# Patient Record
Sex: FEMALE | Race: WHITE | NOT HISPANIC OR LATINO | Employment: OTHER | URBAN - METROPOLITAN AREA
[De-identification: names, ages, dates, MRNs, and addresses within clinical notes are randomized per-mention and may not be internally consistent; named-entity substitution may affect disease eponyms.]

---

## 2017-09-11 ENCOUNTER — OFFICE VISIT (OUTPATIENT)
Dept: DERMATOLOGY | Facility: CLINIC | Age: 62
End: 2017-09-11
Payer: COMMERCIAL

## 2017-09-11 VITALS — SYSTOLIC BLOOD PRESSURE: 115 MMHG | HEART RATE: 62 BPM | DIASTOLIC BLOOD PRESSURE: 67 MMHG | OXYGEN SATURATION: 99 %

## 2017-09-11 DIAGNOSIS — B35.3 TINEA PEDIS OF LEFT FOOT: Primary | ICD-10-CM

## 2017-09-11 DIAGNOSIS — L82.1 SEBORRHEIC KERATOSIS: ICD-10-CM

## 2017-09-11 DIAGNOSIS — D18.00 ANGIOMA: ICD-10-CM

## 2017-09-11 DIAGNOSIS — D22.9 NEVUS: ICD-10-CM

## 2017-09-11 DIAGNOSIS — L81.4 LENTIGO: ICD-10-CM

## 2017-09-11 PROCEDURE — 99203 OFFICE O/P NEW LOW 30 MIN: CPT | Performed by: PHYSICIAN ASSISTANT

## 2017-09-11 RX ORDER — KETOCONAZOLE 20 MG/G
CREAM TOPICAL
Qty: 30 G | Refills: 1 | Status: SHIPPED | OUTPATIENT
Start: 2017-09-11 | End: 2018-10-18

## 2017-09-11 NOTE — MR AVS SNAPSHOT
"              After Visit Summary   2017    Hortencia Ramos    MRN: [id number]           Patient Information     Date Of Birth          1955        Visit Information        Provider Department      2017 1:30 PM Kathryn Schmidt PA-C [facility]        Today's Diagnoses     Tinea pedis of left foot    -  1    Nevus        Lentigo        Angioma        Seborrheic keratosis           Follow-ups after your visit        Who to contact     If you have questions or need follow up information about today's clinic visit or your schedule please contact [facility] directly at [phone].  Normal or non-critical lab and imaging results will be communicated to you by PernixDatahart, letter or phone within 4 business days after the clinic has received the results. If you do not hear from us within 7 days, please contact the clinic through PernixDatahart or phone. If you have a critical or abnormal lab result, we will notify you by phone as soon as possible.  Submit refill requests through Circular or call your pharmacy and they will forward the refill request to us. Please allow 3 business days for your refill to be completed.          Additional Information About Your Visit        MyChart Information     Circular lets you send messages to your doctor, view your test results, renew your prescriptions, schedule appointments and more. To sign up, go to [email or web address] . Click on \"Log in\" on the left side of the screen, which will take you to the Welcome page. Then click on \"Sign up Now\" on the right side of the page.     You will be asked to enter the access code listed below, as well as some personal information. Please follow the directions to create your username and password.     Your access code is: [email or web address]  Expires: 12/10/2017  2:42 PM     Your access code will  in 90 days. If you need help or a new code, please call your [facility] or [phone].        Care EveryWhere " ID     This is your Care EveryWhere ID. This could be used by other organizations to access your [place] medical records  [id number]        Your Vitals Were     Pulse Pulse Oximetry                62 99%           Blood Pressure from Last 3 Encounters:   09/11/17 115/67    Weight from Last 3 Encounters:   No data found for Wt              Today, you had the following     No orders found for display         Today's Medication Changes          These changes are accurate as of: 9/11/17  2:42 PM.  If you have any questions, ask your nurse or doctor.               Start taking these medicines.        Dose/Directions    ketoconazole 2 % cream   Commonly known as:  NIZORAL   Used for:  Tinea pedis of left foot   Started by:  Kathryn Schmidt PA-C        Apply to AA BID x 2-3 weeks   Quantity:  30 g   Refills:  1            Where to get your medicines      These medications were sent to [facility]- - [address] [address]     Phone:  [phone]     ketoconazole 2 % cream                Primary Care Provider    None Specified       No primary provider on file.        Equal Access to Services     IRON MÉNDEZ : Hadmiguelito parro Sobryant, waaxda luqadaha, qaybta kaalmada adeegyavern, ce mariscal . So [facility] [phone].    ATENCIÓN: Si habla español, tiene a najera disposición servicios gratuitos de asistencia lingüística. Llame al [phone].    We comply with applicable federal civil rights laws and [place] laws. We do not discriminate on the basis of race, color, national origin, age, disability sex, sexual orientation or gender identity.            Thank you!     Thank you for choosing [facility]  for your care. Our goal is always to provide you with excellent care. Hearing back from our patients is one way we can continue to improve our services. Please take a few minutes to complete the written  survey that you may receive in the mail after your visit with us. Thank you!             Your Updated Medication List - Protect others around you: Learn how to safely use, store and throw away your medicines at www.disposemymeds.org.          This list is accurate as of: 9/11/17  2:42 PM.  Always use your most recent med list.                   Brand Name Dispense Instructions for use Diagnosis    CITALOPRAM HYDROBROMIDE PO      Take 10 mg by mouth        IMITREX PO      Take by mouth every 8 hours as needed for migraine        ketoconazole 2 % cream    NIZORAL    30 g    Apply to AA BID x 2-3 weeks    Tinea pedis of left foot

## 2017-09-11 NOTE — NURSING NOTE
Chief Complaint   Patient presents with     Derm Problem     skin check       Initial /67  Pulse 62  SpO2 99% There is no height or weight on file to calculate BMI.  BP completed using cuff size: naty House LPN

## 2017-09-11 NOTE — PROGRESS NOTES
HPI:   Hortencia Ramos is a 61 year old female who presents for Full skin cancer screening.  chief complaint  Last Skin Exam: n/a      1st Baseline: yes  Personal HX of Skin Cancer: no   Personal HX of Malignant Melanoma: no   Family HX of Skin Cancer / Malignant Melanoma: Parents with possible NMSC  Personal HX of Atypical Moles:   no  Risk factors: frequent sun exposure; blistering burns in youth  New / Changing lesions:no  Social History: Just became a great grandmother  On review of systems, there are no further skin complaints, patient is feeling otherwise well.  See patient intake sheet.  ROS of the following were done and are negative: Constitutional, Eyes, Ears, Nose,   Mouth, Throat, Cardiovascular, Respiratory, GI, Genitourinary, Musculoskeletal,   Psychiatric, Endocrine, Allergic/Immunologic.    PHYSICAL EXAM:   Weight:  BP:   Skin exam performed as follows: Type 2 skin. Mood appropriate  Alert and Oriented X 3. Well developed, well nourished in no distress.  General appearance: Normal  Head including face: Normal  Eyes: conjunctiva and lids: Normal  Mouth: Lips, teeth, gums: Normal  Neck: Normal  Chest-breast/axillae: Normal  Back: Normal  Spleen and liver: Normal  Cardiovascular: Exam of peripheral vascular system by observation for swelling, varicosities, edema: Normal  Genitalia: groin, buttocks: Normal  Extremities: digits/nails (clubbing): Normal  Eccrine and Apocrine glands: Normal  Right upper extremity: Normal  Left upper extremity: Normal  Right lower extremity: Normal  Left lower extremity: Normal  Skin: Scalp and body hair: See below    Pt deferred exam of breasts, groin, buttocks: No    Other physical findings:  1. Multiple pigmented macules on extremities and trunk  2. Multiple pigmented macules on face, trunk and extremities  3. Multiple vascular papules on trunk, arms and legs  4. Multiple scattered keratotic plaques       Except as noted above, no other signs of skin cancer or melanoma.      ASSESSMENT/PLAN:   Benign Full skin cancer screening today. . Patient with history of none  Advised on monthly self exams and 1 year  Patient Education: Appropriate brochures given.    Multiple benign appearing nevi on arms, legs and trunk. Discussed ABCDEs of melanoma and sunscreen.   Multiple lentigos on arms, legs and trunk. Advised benign, no treatment needed.  Multiple scattered angiomas. Advised benign, no treatment needed.   Seborrheic keratosis on arms, legs and trunk. Advised benign, no treatment needed.  Tinea pedis vs eczema on left foot - start ketoconazole cream BID x 2-3 weeks. If no better will switch to a topical steroid.       Follow-up: yearly FSE/PRN sooner    1.) Patient was asked about new and changing moles. YES  2.) Patient received a complete physical skin examination: YES  3.) Patient was counseled to perform a monthly self skin examination: YES  Scribed By: Kathryn Schmidt, MS, PAISATU

## 2018-10-18 ENCOUNTER — OFFICE VISIT (OUTPATIENT)
Dept: DERMATOLOGY | Facility: CLINIC | Age: 63
End: 2018-10-18
Payer: COMMERCIAL

## 2018-10-18 VITALS — HEART RATE: 77 BPM | DIASTOLIC BLOOD PRESSURE: 67 MMHG | SYSTOLIC BLOOD PRESSURE: 118 MMHG | OXYGEN SATURATION: 98 %

## 2018-10-18 DIAGNOSIS — L81.4 LENTIGO: ICD-10-CM

## 2018-10-18 DIAGNOSIS — D22.9 NEVUS: Primary | ICD-10-CM

## 2018-10-18 DIAGNOSIS — D18.00 ANGIOMA: ICD-10-CM

## 2018-10-18 DIAGNOSIS — L82.1 SEBORRHEIC KERATOSIS: ICD-10-CM

## 2018-10-18 PROCEDURE — 99214 OFFICE O/P EST MOD 30 MIN: CPT | Performed by: PHYSICIAN ASSISTANT

## 2018-10-18 RX ORDER — CITALOPRAM HYDROBROMIDE 10 MG/1
10 TABLET ORAL
COMMUNITY
Start: 2018-08-02

## 2018-10-18 RX ORDER — MULTIVIT-MINERALS/FOLIC ACID 200 MCG
TABLET,CHEWABLE ORAL
COMMUNITY
Start: 2018-05-17

## 2018-10-18 RX ORDER — SUMATRIPTAN 25 MG/1
25 TABLET, FILM COATED ORAL
COMMUNITY
Start: 2018-05-17

## 2018-10-18 RX ORDER — MULTIVIT WITH MINERALS/LUTEIN
1000 TABLET ORAL
COMMUNITY
Start: 2018-05-17

## 2018-10-18 NOTE — NURSING NOTE
Initial /67  Pulse 77  SpO2 98% There is no height or weight on file to calculate BMI. .    Aletha Rizo LPN

## 2018-10-18 NOTE — MR AVS SNAPSHOT
"              After Visit Summary   10/18/2018    Hortencia Ramos    MRN: [id number]           Patient Information     Date Of Birth          1955        Visit Information        Provider Department      10/18/2018 11:30 AM Kathryn Schmidt PA-C [facility]        Today's Diagnoses     Nevus    -  1    Lentigo        Angioma        Seborrheic keratosis           Follow-ups after your visit        Who to contact     If you have questions or need follow up information about today's clinic visit or your schedule please contact [facility] directly at [phone].  Normal or non-critical lab and imaging results will be communicated to you by Call Loophart, letter or phone within 4 business days after the clinic has received the results. If you do not hear from us within 7 days, please contact the clinic through Call Loophart or phone. If you have a critical or abnormal lab result, we will notify you by phone as soon as possible.  Submit refill requests through ICONOGRAFICO or call your pharmacy and they will forward the refill request to us. Please allow 3 business days for your refill to be completed.          Additional Information About Your Visit        MyChart Information     ICONOGRAFICO lets you send messages to your doctor, view your test results, renew your prescriptions, schedule appointments and more. To sign up, go to [email or web address] . Click on \"Log in\" on the left side of the screen, which will take you to the Welcome page. Then click on \"Sign up Now\" on the right side of the page.     You will be asked to enter the access code listed below, as well as some personal information. Please follow the directions to create your username and password.     Your access code is: [email or web address]  Expires: 2019 11:44 AM     Your access code will  in 90 days. If you need help or a new code, please call your [facility] or [phone].        Care EveryWhere ID     This is your Care " EveryWhere ID. This could be used by other organizations to access your [place] medical records  [id number]        Your Vitals Were     Pulse Pulse Oximetry                77 98%           Blood Pressure from Last 3 Encounters:   10/18/18 118/67   09/11/17 115/67    Weight from Last 3 Encounters:   No data found for Wt              Today, you had the following     No orders found for display       Primary Care Provider Office Phone # Fax #    Larissa Jean Pierre Newman [phone]       [facility] [address]  [place] [address]        Equal Access to Services     [facility]: Hadii aad ku hadasho Soomaali, waaxda luqadaha, qaybta kaalmada adeegyada, ce mariscal . So [facility] [phone].    ATENCIÓN: Si habla español, tiene a najera disposición servicios gratuitos de asistencia lingüística. [facility] [phone].    We comply with applicable federal civil rights laws and [place] laws. We do not discriminate on the basis of race, color, national origin, age, disability, sex, sexual orientation, or gender identity.            Thank you!     Thank you for choosing [facility]  for your care. Our goal is always to provide you with excellent care. Hearing back from our patients is one way we can continue to improve our services. Please take a few minutes to complete the written survey that you may receive in the mail after your visit with us. Thank you!             Your Updated Medication List - Protect others around you: Learn how to safely use, store and throw away your medicines at www.disposemymeds.org.          This list is accurate as of 10/18/18 11:44 AM.  Always use your most recent med list.                   Brand Name Dispense Instructions for use Diagnosis    ascorbic acid 1000 MG Tabs    vitamin C     Take 1,000 mg by mouth        calcium carbonate 500 mg-vitamin D 200 units 500-200 MG-UNIT per tablet    OSCAL with D;OYSTER SHELL CALCIUM      Take 1 tablet by mouth        citalopram 10 MG tablet    celeXA     Take 10 mg by mouth        [employer] PROBIOTIC MAXIMUM STRENGTH Caps      Patient reports taking 2 times daily        FIBER CHOICE PO           GLUCOSAMINE HCL PO      Take 500 mg by mouth        SUMAtriptan 25 MG tablet    IMITREX     Take 25 mg by mouth

## 2018-10-18 NOTE — PROGRESS NOTES
HPI:   Hortencia Ramos is a 62 year old female who presents for Full skin cancer screening.  chief complaint  Last Skin Exam: n/a      1st Baseline: yes  Personal HX of Skin Cancer: no   Personal HX of Malignant Melanoma: no   Family HX of Skin Cancer / Malignant Melanoma: Parents with possible NMSC  Personal HX of Atypical Moles:   no  Risk factors: frequent sun exposure; blistering burns in youth  New / Changing lesions:no  Social History: has a 2 yo great grandson. [place] to [place] during the winter months.   On review of systems, there are no further skin complaints, patient is feeling otherwise well.  See patient intake sheet.  ROS of the following were done and are negative: Constitutional, Eyes, Ears, Nose,   Mouth, Throat, Cardiovascular, Respiratory, GI, Genitourinary, Musculoskeletal,   Psychiatric, Endocrine, Allergic/Immunologic.    PHYSICAL EXAM:   /67  Pulse 77  SpO2 98%  Skin exam performed as follows: Type 2 skin. Mood appropriate  Alert and Oriented X 3. Well developed, well nourished in no distress.  General appearance: Normal  Head including face: Normal  Eyes: conjunctiva and lids: Normal  Mouth: Lips, teeth, gums: Normal  Neck: Normal  Chest-breast/axillae: Normal  Back: Normal  Spleen and liver: Normal  Cardiovascular: Exam of peripheral vascular system by observation for swelling, varicosities, edema: Normal  Genitalia: groin, buttocks: Normal  Extremities: digits/nails (clubbing): Normal  Eccrine and Apocrine glands: Normal  Right upper extremity: Normal  Left upper extremity: Normal  Right lower extremity: Normal  Left lower extremity: Normal  Skin: Scalp and body hair: See below    Pt deferred exam of breasts, groin, buttocks: No    Other physical findings:  1. Multiple pigmented macules on extremities and trunk  2. Multiple pigmented macules on face, trunk and extremities  3. Multiple vascular papules on trunk, arms and legs  4. Multiple scattered keratotic plaques        Except as noted above, no other signs of skin cancer or melanoma.     ASSESSMENT/PLAN:   Benign Full skin cancer screening today. . Patient with history of none  Advised on monthly self exams and 1 year  Patient Education: Appropriate brochures given.    Multiple benign appearing nevi on arms, legs and trunk. Discussed ABCDEs of melanoma and sunscreen.   Multiple lentigos on arms, legs and trunk. Advised benign, no treatment needed.  Multiple scattered angiomas. Advised benign, no treatment needed.   Seborrheic keratosis on arms, legs and trunk. Advised benign, no treatment needed.         Follow-up: yearly FSE/PRN sooner    1.) Patient was asked about new and changing moles. YES  2.) Patient received a complete physical skin examination: YES  3.) Patient was counseled to perform a monthly self skin examination: YES  Scribed By: Kathryn Schmidt MS, PA-C

## 2018-10-18 NOTE — LETTER
10/18/2018         RE: Hortencia Ramos  [address] [facility] [address]        Dear Colleague,    Thank you for referring your patient, Hortencia Ramos, to the [facility]. Please see a copy of my visit note below.    HPI:   Hortencia Ramos is a 62 year old female who presents for Full skin cancer screening.  chief complaint  Last Skin Exam: n/a      1st Baseline: yes  Personal HX of Skin Cancer: no   Personal HX of Malignant Melanoma: no   Family HX of Skin Cancer / Malignant Melanoma: Parents with possible NMSC  Personal HX of Atypical Moles:   no  Risk factors: frequent sun exposure; blistering burns in youth  New / Changing lesions:no  Social History: has a 2 yo great grandson. [place] to [place] the [place].   On review of systems, there are no further skin complaints, patient is feeling otherwise well.  See patient intake sheet.  ROS of the following were done and are negative: Constitutional, Eyes, Ears, Nose,   Mouth, Throat, Cardiovascular, Respiratory, GI, Genitourinary, Musculoskeletal,   Psychiatric, Endocrine, Allergic/Immunologic.    PHYSICAL EXAM:   /67  Pulse 77  SpO2 98%  Skin exam performed as follows: Type 2 skin. Mood appropriate  Alert and Oriented X 3. Well developed, well nourished in no distress.  General appearance: Normal  Head including face: Normal  Eyes: conjunctiva and lids: Normal  Mouth: Lips, teeth, gums: Normal  Neck: Normal  Chest-breast/axillae: Normal  Back: Normal  Spleen and liver: Normal  Cardiovascular: Exam of peripheral vascular system by observation for swelling, varicosities, edema: Normal  Genitalia: groin, buttocks: Normal  Extremities: digits/nails (clubbing): Normal  Eccrine and Apocrine glands: Normal  Right upper extremity: Normal  Left upper extremity: Normal  Right lower extremity: Normal  Left lower extremity: Normal  Skin: Scalp and body hair: See below    Pt deferred exam of breasts, groin, buttocks:  No    Other physical findings:  1. Multiple pigmented macules on extremities and trunk  2. Multiple pigmented macules on face, trunk and extremities  3. Multiple vascular papules on trunk, arms and legs  4. Multiple scattered keratotic plaques       Except as noted above, no other signs of skin cancer or melanoma.     ASSESSMENT/PLAN:   Benign Full skin cancer screening today. . Patient with history of none  Advised on monthly self exams and 1 year  Patient Education: Appropriate brochures given.    Multiple benign appearing nevi on arms, legs and trunk. Discussed ABCDEs of melanoma and sunscreen.   Multiple lentigos on arms, legs and trunk. Advised benign, no treatment needed.  Multiple scattered angiomas. Advised benign, no treatment needed.   Seborrheic keratosis on arms, legs and trunk. Advised benign, no treatment needed.         Follow-up: yearly FSE/PRN sooner    1.) Patient was asked about new and changing moles. YES  2.) Patient received a complete physical skin examination: YES  3.) Patient was counseled to perform a monthly self skin examination: YES  Scribed By: Kathryn Schmidt, MS, PABaldevC      Again, thank you for allowing me to participate in the care of your patient.        Sincerely,        Kathryn Schmidt PA-C

## 2018-10-23 ENCOUNTER — HEALTH MAINTENANCE LETTER (OUTPATIENT)
Age: 63
End: 2018-10-23

## 2019-10-21 ENCOUNTER — OFFICE VISIT (OUTPATIENT)
Dept: DERMATOLOGY | Facility: CLINIC | Age: 64
End: 2019-10-21
Payer: COMMERCIAL

## 2019-10-21 VITALS
TEMPERATURE: 97.8 F | SYSTOLIC BLOOD PRESSURE: 105 MMHG | DIASTOLIC BLOOD PRESSURE: 71 MMHG | HEART RATE: 72 BPM | RESPIRATION RATE: 20 BRPM

## 2019-10-21 DIAGNOSIS — D18.01 ANGIOMA OF SKIN: ICD-10-CM

## 2019-10-21 DIAGNOSIS — L81.4 LENTIGO: ICD-10-CM

## 2019-10-21 DIAGNOSIS — L30.9 ACUTE DERMATITIS: Primary | ICD-10-CM

## 2019-10-21 DIAGNOSIS — L82.1 SEBORRHEIC KERATOSIS: ICD-10-CM

## 2019-10-21 DIAGNOSIS — D22.9 NEVUS: ICD-10-CM

## 2019-10-21 PROCEDURE — 99214 OFFICE O/P EST MOD 30 MIN: CPT | Performed by: PHYSICIAN ASSISTANT

## 2019-10-21 RX ORDER — TRIAMCINOLONE ACETONIDE 1 MG/G
CREAM TOPICAL
Qty: 60 G | Refills: 2 | Status: SHIPPED | OUTPATIENT
Start: 2019-10-21

## 2019-10-21 NOTE — PROGRESS NOTES
Initial /71 (BP Location: Right arm, Patient Position: Sitting, Cuff Size: Adult Large)   Pulse 72   Temp 97.8  F (36.6  C) (Tympanic)   Resp 20  There is no height or weight on file to calculate BMI. .    Luca COLEMAN RN   Specialty Clinics

## 2019-10-21 NOTE — LETTER
10/21/2019         RE: Hortencia Ramos  [address] [address] [address]        Dear Colleague,    Thank you for referring your patient, Hortencia Ramos, to the [facility]. Please see a copy of my visit note below.    Initial /71 (BP Location: Right arm, Patient Position: Sitting, Cuff Size: Adult Large)   Pulse 72   Temp 97.8  F (36.6  C) (Tympanic)   Resp 20  There is no height or weight on file to calculate BMI. .    Luca COLEMAN RN   Specialty Clinics     HPI:   Chief complaints: Hortencia Ramos is a 63 year old female who presents for Full skin cancer screening to rule out skin cancer   Last Skin Exam: 1 year ago      1st Baseline: no  Personal HX of Skin Cancer: no   Personal HX of Malignant Melanoma: no   Family HX of Skin Cancer / Malignant Melanoma: Yes parents with possible NMSC  Personal HX of Atypical Moles:   no  Risk factors: history of frequent sun exposure and burns; goes to [place] for the winter months  New / Changing lesions:no  Social History: Goes to [place] for the winter months. Her daughter and grandson live there after moving there to get farther from father who has issues with chemical dependency.   On review of systems, there are no further skin complaints, patient is feeling otherwise well.  See patient intake sheet.  ROS of the following were done and are negative: Constitutional, Eyes, Ears, Nose,   Mouth, Throat, Cardiovascular, Respiratory, GI, Genitourinary, Musculoskeletal,   Psychiatric, Endocrine, Allergic/Immunologic.    PHYSICAL EXAM:   /71 (BP Location: Right arm, Patient Position: Sitting, Cuff Size: Adult Large)   Pulse 72   Temp 97.8  F (36.6  C) (Tympanic)   Resp 20   Skin exam performed as follows: Type 2 skin. Mood appropriate  Alert and Oriented X 3. Well developed, well nourished in no distress.  General appearance: Normal  Head including face: Normal  Eyes: conjunctiva and lids: Normal  Mouth: Lips, teeth, gums: Normal  Neck:  Normal  Chest-breast/axillae: Normal  Back: Normal  Spleen and liver: Normal  Cardiovascular: Exam of peripheral vascular system by observation for swelling, varicosities, edema: Normal  Genitalia: groin, buttocks: Normal  Extremities: digits/nails (clubbing): Normal  Eccrine and Apocrine glands: Normal  Right upper extremity: Normal  Left upper extremity: Normal  Right lower extremity: Normal  Left lower extremity: Normal  Skin: Scalp and body hair: See below    Pt deferred exam of breasts, groin, buttocks: No    Other physical findings:  1. Multiple pigmented macules on extremities and trunk  2. Multiple pigmented macules on face, trunk and extremities  3. Multiple vascular papules on trunk, arms and legs  4. Multiple scattered keratotic plaques  5. Dermatitis on the left plantar surface       Except as noted above, no other signs of skin cancer or melanoma.     ASSESSMENT/PLAN:   Benign Full skin cancer screening today. . Patient with history of none  Advised on monthly self exams and 1 year  Patient Education: Appropriate brochures given.    1. Multiple benign appearing nevi on arms, legs and trunk. Discussed ABCDEs of melanoma and sunscreen.   2. Multiple lentigos on arms, legs and trunk. Advised benign, no treatment needed.  3. Multiple scattered angiomas. Advised benign, no treatment needed.   4. Seborrheic keratosis on arms, legs and trunk. Advised benign, no treatment needed.  5. 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 Hortencia to follow up with Primary Care provider regarding elevated blood pressure.            Follow-up: yearly FSE/PRN sooner    1.) Patient was asked about new and changing moles. YES  2.) Patient received a complete physical skin examination: YES  3.) Patient was counseled to perform a monthly self skin examination: YES  Scribed By: Kathryn Schmidt, MS, PA-C        Again, thank you for allowing me to participate in the care  of your patient.        Sincerely,        Kathryn Oconnor PA-C

## 2019-10-21 NOTE — PROGRESS NOTES
HPI:   Chief complaints: Hortencia Ramos is a 63 year old female who presents for Full skin cancer screening to rule out skin cancer   Last Skin Exam: 1 year ago      1st Baseline: no  Personal HX of Skin Cancer: no   Personal HX of Malignant Melanoma: no   Family HX of Skin Cancer / Malignant Melanoma: Yes parents with possible NMSC  Personal HX of Atypical Moles:   no  Risk factors: history of frequent sun exposure and burns; goes to [place] for the winter months  New / Changing lesions:no  Social History: Goes to [place] for the winter months. Her daughter and grandson live there after moving there to get farther from father who has issues with chemical dependency.   On review of systems, there are no further skin complaints, patient is feeling otherwise well.  See patient intake sheet.  ROS of the following were done and are negative: Constitutional, Eyes, Ears, Nose,   Mouth, Throat, Cardiovascular, Respiratory, GI, Genitourinary, Musculoskeletal,   Psychiatric, Endocrine, Allergic/Immunologic.    PHYSICAL EXAM:   /71 (BP Location: Right arm, Patient Position: Sitting, Cuff Size: Adult Large)   Pulse 72   Temp 97.8  F (36.6  C) (Tympanic)   Resp 20   Skin exam performed as follows: Type 2 skin. Mood appropriate  Alert and Oriented X 3. Well developed, well nourished in no distress.  General appearance: Normal  Head including face: Normal  Eyes: conjunctiva and lids: Normal  Mouth: Lips, teeth, gums: Normal  Neck: Normal  Chest-breast/axillae: Normal  Back: Normal  Spleen and liver: Normal  Cardiovascular: Exam of peripheral vascular system by observation for swelling, varicosities, edema: Normal  Genitalia: groin, buttocks: Normal  Extremities: digits/nails (clubbing): Normal  Eccrine and Apocrine glands: Normal  Right upper extremity: Normal  Left upper extremity: Normal  Right lower extremity: Normal  Left lower extremity: Normal  Skin: Scalp and body hair: See below    Pt deferred exam of breasts, groin,  buttocks: No    Other physical findings:  1. Multiple pigmented macules on extremities and trunk  2. Multiple pigmented macules on face, trunk and extremities  3. Multiple vascular papules on trunk, arms and legs  4. Multiple scattered keratotic plaques  5. Dermatitis on the left plantar surface       Except as noted above, no other signs of skin cancer or melanoma.     ASSESSMENT/PLAN:   Benign Full skin cancer screening today. . Patient with history of none  Advised on monthly self exams and 1 year  Patient Education: Appropriate brochures given.    1. Multiple benign appearing nevi on arms, legs and trunk. Discussed ABCDEs of melanoma and sunscreen.   2. Multiple lentigos on arms, legs and trunk. Advised benign, no treatment needed.  3. Multiple scattered angiomas. Advised benign, no treatment needed.   4. Seborrheic keratosis on arms, legs and trunk. Advised benign, no treatment needed.  5. 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 Hortencia to follow up with Primary Care provider regarding elevated blood pressure.            Follow-up: yearly FSE/PRN sooner    1.) Patient was asked about new and changing moles. YES  2.) Patient received a complete physical skin examination: YES  3.) Patient was counseled to perform a monthly self skin examination: YES  Scribed By: Kathryn Schmidt MS, PAISATU

## 2020-05-17 ENCOUNTER — VIRTUAL VISIT (OUTPATIENT)
Dept: URGENT CARE | Facility: CLINIC | Age: 65
End: 2020-05-17
Payer: COMMERCIAL

## 2020-05-17 DIAGNOSIS — J32.9 CHRONIC SINUSITIS, UNSPECIFIED LOCATION: Primary | ICD-10-CM

## 2020-05-17 PROCEDURE — 99203 OFFICE O/P NEW LOW 30 MIN: CPT | Mod: 95 | Performed by: FAMILY MEDICINE

## 2020-05-17 ASSESSMENT — ENCOUNTER SYMPTOMS
SHORTNESS OF BREATH: 0
EYE DISCHARGE: 0
CONSTIPATION: 0
VOMITING: 0
ABDOMINAL PAIN: 0
COUGH: 0
MYALGIAS: 0
DIARRHEA: 0
SINUS PAIN: 0
TROUBLE SWALLOWING: 0
FEVER: 0
DIFFICULTY URINATING: 0
RHINORRHEA: 1
FREQUENCY: 0
DYSPHORIC MOOD: 0
HEADACHES: 1
CHILLS: 0
APPETITE CHANGE: 0
EYE REDNESS: 0

## 2020-05-17 NOTE — PROGRESS NOTES
"The patient has been notified of following:     \"This telephone visit will be conducted via a call between you and your physician/provider. We have found that certain health care needs can be provided without the need for a physical exam.  This service lets us provide the care you need with a short phone conversation.  If a prescription is necessary we can send it directly to your pharmacy.      Telephone visits are billed at different rates depending on your insurance coverage. During this emergency period, for some insurers they may be billed the same as an in-person visit.  Please reach out to your insurance provider with any questions.    If during the course of the call the physician/provider feels a telephone visit is not appropriate, you will not be charged for this service.\"    Patient has given verbal consent for Telephone visit?  Yes      Subjective     CC: Hortencia Ramos is a 64 year old female with a history of chronic sinus problems and migraines since 2010 who presents to [facility] urgent care today for questions concerning these chronic problems. In 2010, she first developed these persistent sinus pain and discovered she was allergic to her dog. Since 2017, her symptoms seem to be related to seasonal allergies as they change with geographic location (worse in [place], better in [place]). She has also had about one acute sinus infection per year requiring antibiotics. However, even in [place] she is still has sinus congestion and migraines. Lately, her migraines have become more frequent (she has had two in the last week and a half). She normally takes rizatriptan when she gets a migraine but does not currently take anything for prophylaxis. Her last migraine was two days ago. She is not currently having a migraine or acute sinus symptoms and specifically denies fever/chills, headaches, visual disturbances and ear pain. She does have congestion, rhinorrhea and facial pressure when leaning forward, " but this is always present. Her primary doctor is Dr. Newman in [place], who she will be seeing in person later this week.    Chief Complaint   Patient presents with     Sinus Problem              There is no problem list on file for this patient.    Current Outpatient Medications   Medication     ascorbic acid (VITAMIN C) 1000 MG TABS     calcium carbonate 500 mg-vitamin D 200 units (OSCAL WITH D;OYSTER SHELL CALCIUM) 500-200 MG-UNIT per tablet     citalopram (CELEXA) 10 MG tablet     GLUCOSAMINE HCL PO     Inulin (FIBER CHOICE PO)     Probiotic Product ([employer] PROBIOTIC MAXIMUM STRENGTH) CAPS     SUMAtriptan (IMITREX) 25 MG tablet     triamcinolone (KENALOG) 0.1 % external cream     No current facility-administered medications for this visit.         Allergies   Allergen Reactions     Nitrofurantoin Nausea and Vomiting     Patient states that she had diarrhea as well.     Sulfa Drugs Rash     Sulfasalazine Rash       Reviewed and updated as needed this visit by Provider    Review of Systems   Constitutional: Negative for appetite change, chills and fever.   HENT: Positive for congestion and rhinorrhea. Negative for ear discharge, ear pain, hearing loss, sinus pain and trouble swallowing.    Eyes: Negative for discharge, redness and visual disturbance.   Respiratory: Negative for cough and shortness of breath.    Cardiovascular: Negative for chest pain.   Gastrointestinal: Negative for abdominal pain, constipation, diarrhea and vomiting.   Genitourinary: Negative for difficulty urinating, frequency and urgency.   Musculoskeletal: Negative for myalgias.   Skin: Negative for rash.   Neurological: Positive for headaches.   Psychiatric/Behavioral: Negative for dysphoric mood.          Objective    Gen: Patient is alert, oriented  Resp: Speaking full sentence, no audible shortness of breath.  No cough or wheeze.            Assessment/Plan:  Hortencia Ramos is a 65 yo F with a history of chronic sinus problems and  recurrent sinus infections since 2010. She is not acutely symptomatic, however she does want to begin the discussion about whether her migraines increasing in frequency could be related to her sinus problems. Plan to bring up these issues with Dr. Newman to get proper referral to ENT or allergist and to discuss the need for migraine prophylaxis given the increased frequency.    Chronic sinusitis, unspecified location  -Follow up with primary doctor on the need for imaging and/or allergy testing and/or referral to ENT    Migraines  -These migraines may be associated with chronic sinus problem. However, given their increased frequency approaching 4 times per month, discuss migraine prophylaxis with primary doctor.    Phone call duration:  30 minutes    Ozzie Aggarwal   05/17/209:50 AM

## 2020-05-19 NOTE — PROGRESS NOTES
I was present during this telephone visit with the medical student who participated in the service and in the documentation of the note. I have verified the history and personally performed the physical exam and medical decision making. I agree with the assessment and plan of care as documented in the note with the following additions:   10 years of sinus symptoms, unchanged. I recommended discussing ENT referral with her PCP for NPL vs sinus imaging. Could also consider allergy referral first but she has already had patch testing in the past it sounds like and knows her allergens.    I personally spent a total of 9 minutes speaking with Hortencia Ramos during today s visit.     Quinn Burrell MD  1:15 PM, May 19, 2020

## 2021-05-13 ENCOUNTER — OFFICE VISIT (OUTPATIENT)
Dept: DERMATOLOGY | Facility: CLINIC | Age: 66
End: 2021-05-13
Payer: COMMERCIAL

## 2021-05-13 VITALS — OXYGEN SATURATION: 98 % | DIASTOLIC BLOOD PRESSURE: 71 MMHG | HEART RATE: 71 BPM | SYSTOLIC BLOOD PRESSURE: 120 MMHG

## 2021-05-13 DIAGNOSIS — L81.4 LENTIGO: ICD-10-CM

## 2021-05-13 DIAGNOSIS — D18.01 ANGIOMA OF SKIN: ICD-10-CM

## 2021-05-13 DIAGNOSIS — L82.1 SEBORRHEIC KERATOSIS: ICD-10-CM

## 2021-05-13 DIAGNOSIS — D22.9 NEVUS: Primary | ICD-10-CM

## 2021-05-13 PROCEDURE — 99213 OFFICE O/P EST LOW 20 MIN: CPT | Performed by: PHYSICIAN ASSISTANT

## 2021-05-13 RX ORDER — RIZATRIPTAN BENZOATE 5 MG/1
TABLET, ORALLY DISINTEGRATING ORAL
COMMUNITY
Start: 2021-04-09

## 2021-05-13 NOTE — NURSING NOTE
Chief Complaint   Patient presents with     Skin Check       Vitals:    05/13/21 1141   BP: 120/71   Pulse: 71   SpO2: 98%     Wt Readings from Last 1 Encounters:   No data found for Wt       Greta Cohen LPN.................5/13/2021

## 2021-05-13 NOTE — PROGRESS NOTES
HPI:   Chief complaints: Hortencia Ramos is a 65 year old female who presents for Full skin cancer screening to rule out skin cancer   Last Skin Exam: 1 year ago      1st Baseline: no  Personal HX of Skin Cancer: no   Personal HX of Malignant Melanoma: no   Family HX of Skin Cancer / Malignant Melanoma: Yes parents with possible NMSC  Personal HX of Atypical Moles:   no  Risk factors: history of frequent sun exposure and burns; goes to [place] for the winter months  New / Changing lesions:no  Social History: Goes to [place] for the winter months. Her daughter and grandson live there after moving there to get farther from father who has issues with chemical dependency.   On review of systems, there are no further skin complaints, patient is feeling otherwise well.  See patient intake sheet.  ROS of the following were done and are negative: Constitutional, Eyes, Ears, Nose,   Mouth, Throat, Cardiovascular, Respiratory, GI, Genitourinary, Musculoskeletal,   Psychiatric, Endocrine, Allergic/Immunologic.    PHYSICAL EXAM:   /71   Pulse 71   SpO2 98%   Skin exam performed as follows: Type 2 skin. Mood appropriate  Alert and Oriented X 3. Well developed, well nourished in no distress.  General appearance: Normal  Head including face: Normal  Eyes: conjunctiva and lids: Normal  Mouth: Lips, teeth, gums: Normal  Neck: Normal  Chest-breast/axillae: Normal  Back: Normal  Spleen and liver: Normal  Cardiovascular: Exam of peripheral vascular system by observation for swelling, varicosities, edema: Normal  Genitalia: groin, buttocks: Normal  Extremities: digits/nails (clubbing): Normal  Eccrine and Apocrine glands: Normal  Right upper extremity: Normal  Left upper extremity: Normal  Right lower extremity: Normal  Left lower extremity: Normal  Skin: Scalp and body hair: See below    Pt deferred exam of breasts, groin, buttocks: No    Other physical findings:  1. Multiple pigmented macules on extremities and trunk  2. Multiple  pigmented macules on face, trunk and extremities  3. Multiple vascular papules on trunk, arms and legs  4. Multiple scattered keratotic plaques       Except as noted above, no other signs of skin cancer or melanoma.     ASSESSMENT/PLAN:   Benign Full skin cancer screening today. . Patient with history of none  Advised on monthly self exams and 1 year  Patient Education: Appropriate brochures given.    1. Multiple benign appearing nevi on arms, legs and trunk. Discussed ABCDEs of melanoma and sunscreen.   2. Multiple lentigos on arms, legs and trunk. Advised benign, no treatment needed.  3. Multiple scattered angiomas. Advised benign, no treatment needed.   4. Seborrheic keratosis on arms, legs and trunk. Advised benign, no treatment needed.            Follow-up: yearly FSE/PRN sooner    1.) Patient was asked about new and changing moles. YES  2.) Patient received a complete physical skin examination: YES  3.) Patient was counseled to perform a monthly self skin examination: YES  Scribed By: Kathryn Schmidt MS, PA-C

## 2021-05-13 NOTE — LETTER
5/13/2021         RE: Hortencia Ramos  [id number] [address] [place] [address]        Dear Colleague,    Thank you for referring your patient, Hortencia Ramos, to the [facility]. Please see a copy of my visit note below.    HPI:   Chief complaints: Hortencia Ramos is a 65 year old female who presents for Full skin cancer screening to rule out skin cancer   Last Skin Exam: 1 year ago      1st Baseline: no  Personal HX of Skin Cancer: no   Personal HX of Malignant Melanoma: no   Family HX of Skin Cancer / Malignant Melanoma: Yes parents with possible NMSC  Personal HX of Atypical Moles:   no  Risk factors: history of frequent sun exposure and burns; goes to [place] for the winter months  New / Changing lesions:no  Social History: Goes to [place] for the winter months. Her daughter and grandson live there after moving there to get farther from father who has issues with chemical dependency.   On review of systems, there are no further skin complaints, patient is feeling otherwise well.  See patient intake sheet.  ROS of the following were done and are negative: Constitutional, Eyes, Ears, Nose,   Mouth, Throat, Cardiovascular, Respiratory, GI, Genitourinary, Musculoskeletal,   Psychiatric, Endocrine, Allergic/Immunologic.    PHYSICAL EXAM:   /71   Pulse 71   SpO2 98%   Skin exam performed as follows: Type 2 skin. Mood appropriate  Alert and Oriented X 3. Well developed, well nourished in no distress.  General appearance: Normal  Head including face: Normal  Eyes: conjunctiva and lids: Normal  Mouth: Lips, teeth, gums: Normal  Neck: Normal  Chest-breast/axillae: Normal  Back: Normal  Spleen and liver: Normal  Cardiovascular: Exam of peripheral vascular system by observation for swelling, varicosities, edema: Normal  Genitalia: groin, buttocks: Normal  Extremities: digits/nails (clubbing): Normal  Eccrine and Apocrine glands: Normal  Right upper extremity: Normal  Left upper extremity:  Normal  Right lower extremity: Normal  Left lower extremity: Normal  Skin: Scalp and body hair: See below    Pt deferred exam of breasts, groin, buttocks: No    Other physical findings:  1. Multiple pigmented macules on extremities and trunk  2. Multiple pigmented macules on face, trunk and extremities  3. Multiple vascular papules on trunk, arms and legs  4. Multiple scattered keratotic plaques       Except as noted above, no other signs of skin cancer or melanoma.     ASSESSMENT/PLAN:   Benign Full skin cancer screening today. . Patient with history of none  Advised on monthly self exams and 1 year  Patient Education: Appropriate brochures given.    1. Multiple benign appearing nevi on arms, legs and trunk. Discussed ABCDEs of melanoma and sunscreen.   2. Multiple lentigos on arms, legs and trunk. Advised benign, no treatment needed.  3. Multiple scattered angiomas. Advised benign, no treatment needed.   4. Seborrheic keratosis on arms, legs and trunk. Advised benign, no treatment needed.            Follow-up: yearly FSE/PRN sooner    1.) Patient was asked about new and changing moles. YES  2.) Patient received a complete physical skin examination: YES  3.) Patient was counseled to perform a monthly self skin examination: YES  Scribed By: Kathryn Schmidt, MS, PABaldevC          Again, thank you for allowing me to participate in the care of your patient.        Sincerely,        Kathryn Schmidt PA-C

## 2021-05-25 ENCOUNTER — RECORDS - HEALTHEAST (OUTPATIENT)
Dept: ADMINISTRATIVE | Facility: CLINIC | Age: 66
End: 2021-05-25

## 2021-05-26 ENCOUNTER — RECORDS - HEALTHEAST (OUTPATIENT)
Dept: ADMINISTRATIVE | Facility: CLINIC | Age: 66
End: 2021-05-26

## 2021-07-13 ENCOUNTER — RECORDS - HEALTHEAST (OUTPATIENT)
Dept: ADMINISTRATIVE | Facility: CLINIC | Age: 66
End: 2021-07-13

## 2022-06-29 ENCOUNTER — OFFICE VISIT (OUTPATIENT)
Dept: ORTHOPEDICS | Facility: CLINIC | Age: 67
End: 2022-06-29
Payer: COMMERCIAL

## 2022-06-29 VITALS — HEART RATE: 62 BPM | HEIGHT: 69 IN | DIASTOLIC BLOOD PRESSURE: 75 MMHG | SYSTOLIC BLOOD PRESSURE: 121 MMHG

## 2022-06-29 DIAGNOSIS — M17.12 ARTHRITIS OF LEFT KNEE: ICD-10-CM

## 2022-06-29 DIAGNOSIS — M25.562 ACUTE PAIN OF LEFT KNEE: Primary | ICD-10-CM

## 2022-06-29 PROCEDURE — 99203 OFFICE O/P NEW LOW 30 MIN: CPT | Mod: 25 | Performed by: PEDIATRICS

## 2022-06-29 PROCEDURE — 20610 DRAIN/INJ JOINT/BURSA W/O US: CPT | Mod: LT | Performed by: PEDIATRICS

## 2022-06-29 RX ADMIN — TRIAMCINOLONE ACETONIDE 40 MG: 40 INJECTION, SUSPENSION INTRA-ARTICULAR; INTRAMUSCULAR at 14:08

## 2022-06-29 RX ADMIN — LIDOCAINE HYDROCHLORIDE 2 ML: 10 INJECTION, SOLUTION INFILTRATION; PERINEURAL at 14:08

## 2022-06-29 NOTE — PATIENT INSTRUCTIONS
We discussed these other possible diagnosis: more likely aggravation of underlying arthritis, possible meniscal tear.  We discussed the following treatment options: symptom treatment, activity modification/rest, imaging, injection, rehab and referral. Following discussion, plan: will trial injection and Home Exercise Program    Plan:  - Today's Plan of Care:  Steroid injection of the left knee was performed today in clinic  Icing for the next 1-2 days may be helpful for pain. Injection may take 10-14 days to see the full effect.  Continue with relative rest and activity modification, Ice, Compression, and Elevation.  Can apply ice 10-15 minutes 3-4 times per day as needed. OTC medications as needed.  Home Exercise Program - range of motion, quad sets, straight leg raises    -We also discussed other future treatment options:  Referral to Physical Therapy  MRI if severe pain, worsening symptoms    Follow Up: 6 - 8 weeks    If you have any further questions for your physician or physician s care team you can call [phone] and use option 3 to leave a voice message. Calls received during business hours will be returned same day.    After the Injection     After the injection, strenuous and repetitive activity should be minimized for approximately 48 hours.   Ice should be applied to the injected area at least for the next 48 hours.   Apply ice to the injected area at least 3 - 4 times a day for 20 minutes each time for the next 48 hours. This can reduce the painful  flare  reaction that can follow an injection the next day. This reaction can cause the area that was injected to hurt more the next day just from the injection. This will resolve within a day if it does occur.     Use over-the-counter pain medications such as Tylenol to help with the pain if necessary.     After 48 hours, icing the area may be continued if you find it beneficial.     The lidocaine or marcaine (commonly called Novocain) is an anesthetic  agent that is injected with the steroid will typically relieve your pain for a few hours following the injection. If the  Novocain  and steroid are injected near a nerve, you may experience local numbness or weakness from the nerve block until it wears off. After this wears off your pain may return until the steroid takes effect.   The steroid may be effective immediately after the injection. Do not be concerned if the injection is not effective in relieving your symptoms immediately. In some cases, it may take up to two weeks for the steroid to work.   If you are diabetic, the corticosteroid may cause your blood sugar to become elevated for several days following the injection. This response usually lasts about 2-4 days before it returns to your normal level.   You should report any adverse reaction to you doctor. Call if there are any questions.

## 2022-06-29 NOTE — LETTER
6/29/2022         RE: Hortencia Ramos  [address] [facility] [address]        Dear Colleague,    Thank you for referring your patient, Hortencia Ramos, to the [facility] [place]. Please see a copy of my visit note below.    ASSESSMENT & PLAN    Hortencia was seen today for pain.    Diagnoses and all orders for this visit:    Acute pain of left knee  -     XR Knee Standing AP Bilat [place] Bilat Lat Left; Future    Arthritis of left knee    Other orders  -     Large Joint Injection/Arthocentesis: L knee joint      This issue is acute and Unchanged.    We discussed these other possible diagnosis: more likely aggravation of underlying arthritis, possible meniscal tear.  We discussed the following treatment options: symptom treatment, activity modification/rest, imaging, injection, rehab and referral. Following discussion, plan: will trial injection and Home Exercise Program    Plan:  - Today's Plan of Care:  Steroid injection of the left knee was performed today in clinic  Icing for the next 1-2 days may be helpful for pain. Injection may take 10-14 days to see the full effect.  Continue with relative rest and activity modification, Ice, Compression, and Elevation.  Can apply ice 10-15 minutes 3-4 times per day as needed. OTC medications as needed.  Home Exercise Program - range of motion, quad sets, straight leg raises    -We also discussed other future treatment options:  Referral to Physical Therapy  MRI if severe pain, worsening symptoms    Follow Up: 6 - 8 weeks    Concerning signs and symptoms were reviewed.  The patient expressed understanding of this management plan and all questions were answered at this time.    Anahi Campbell MD [facility]  Sports Medicine Physician  [facility]      -----  Chief Complaint   Patient presents with     Left Knee - Pain       SUBJECTIVE  Hortencia Ramos is a/an 66 year old female who is seen as a self referral for evaluation of  "left knee pain.     The patient is seen by themselves.    Onset: 6 week(s) ago. Reports insidious onset without acute precipitating event.  Location of Pain: left knee pain; medial/lateral joint line, posterior   Worsened by: nothing  Better with: icing, resting   Treatments tried: rest/activity avoidance, Tylenol, ibuprofen and casting/splinting/bracing  Associated symptoms: swelling, weakness of left knee, locking or catching and feeling of instability, \"grabbing\" popping    Orthopedic/Surgical history: NO  Social History/Occupation: retired; garden, walking, hiking, kayaking    No family history pertinent to patient's problem today.    REVIEW OF SYSTEMS:  Review of Systems  Skin: no bruising, mild swelling  Musculoskeletal: as above  Neurologic: no numbness, paresthesias  Remainder of review of systems is negative including constitutional, CV, pulmonary, GI, except as noted in HPI or medical history.    OBJECTIVE:  /75   Pulse 62   Ht 1.753 m (5' 9\")    General: healthy, alert and in no distress  HEENT: no scleral icterus or conjunctival erythema  Skin: no suspicious lesions or rash. No jaundice.  CV: distal perfusion intact  Resp: normal respiratory effort without conversational dyspnea   Psych: normal mood and affect  Gait: normal steady gait with appropriate coordination and balance  Neuro: Normal light sensory exam of lower extremity    Bilateral Knee exam    Inspection:      mild effusion left    Patella:      Crepitus noted in the patellofemoral joint bilateral    Tender:      medial joint line left    Non Tender:      remainder of knee area bilateral    Knee ROM:      Full active and passive ROM with flexion and extension bilateral    Hip ROM:     Full active and passive ROM bilateral    Strength:      5/5 with knee extension left    Special Tests:     neg (-) Vanessa bilateral       neg (-) anterior drawer bilateral       neg (-) posterior drawer bilateral       neg (-) varus at 0 deg and 30 deg " left       neg (-) valgus at 0 deg and 30 deg left    Gait:      normal    Lower Extremity Alignment:      Normal    Neurovascular:      2+ peripheral pulses bilaterally and brisk capillary refill       sensation grossly intact    RADIOLOGY:  I independently ordered, visualized and reviewed these images with the patient  AP and sunrise bilateral and left lateral XR views of knees reviewed: no acute bony abnormality, mild medial compartment degenerative change  - will follow official read    Review of the result(s) of each unique test - xr       Large Joint Injection/Arthocentesis: L knee joint    Date/Time: 6/29/2022 2:08 PM  Performed by: Anahi Campbell MD  Authorized by: Anahi Campbell MD     Indications:  Pain  Needle Size:  25 G  Guidance: landmark guided    Approach:  Anterolateral  Location:  Knee      Medications:  2 mL lidocaine 1 %; 40 mg triamcinolone 40 MG/ML  Procedure discussed: discussed risks, benefits, and alternatives    Consent Given by:  Patient  Timeout: timeout called immediately prior to procedure    Prep: patient was prepped and draped in usual sterile fashion     The risks, benefits and complications of steroid injection were discussed with the patient (including but not limited to: bleeding, infection, pain, scar, damage to adjacent structures, atrophy or necrosis of soft tissue, skin blanching, failure to relieve symptoms, worsening of symptoms, allergic reaction). After this discussion all questions were addressed and answered and the patient elected to proceed. The patient tolerated the procedure well without complications.  Also discussed that if diabetic, recommend close monitoring of blood sugars over the next week as cortisone injections can temporarily elevate blood sugars.            Again, thank you for allowing me to participate in the care of your patient.        Sincerely,        Anahi Campbell MD

## 2022-06-29 NOTE — PROGRESS NOTES
ASSESSMENT & PLAN    Hortencia was seen today for pain.    Diagnoses and all orders for this visit:    Acute pain of left knee  -     XR Knee Standing AP Bilat [place] Bilat Lat Left; Future    Arthritis of left knee    Other orders  -     Large Joint Injection/Arthocentesis: L knee joint      This issue is acute and Unchanged.    We discussed these other possible diagnosis: more likely aggravation of underlying arthritis, possible meniscal tear.  We discussed the following treatment options: symptom treatment, activity modification/rest, imaging, injection, rehab and referral. Following discussion, plan: will trial injection and Home Exercise Program    Plan:  - Today's Plan of Care:  Steroid injection of the left knee was performed today in clinic  Icing for the next 1-2 days may be helpful for pain. Injection may take 10-14 days to see the full effect.  Continue with relative rest and activity modification, Ice, Compression, and Elevation.  Can apply ice 10-15 minutes 3-4 times per day as needed. OTC medications as needed.  Home Exercise Program - range of motion, quad sets, straight leg raises    -We also discussed other future treatment options:  Referral to Physical Therapy  MRI if severe pain, worsening symptoms    Follow Up: 6 - 8 weeks    Concerning signs and symptoms were reviewed.  The patient expressed understanding of this management plan and all questions were answered at this time.    Anahi Campbell MD [facility]  Sports Medicine Physician  [facility]      -----  Chief Complaint   Patient presents with     Left Knee - Pain       SUBJECTIVE  Hortencia Ramos is a/an 66 year old female who is seen as a self referral for evaluation of left knee pain.     The patient is seen by themselves.    Onset: 6 week(s) ago. Reports insidious onset without acute precipitating event.  Location of Pain: left knee pain; medial/lateral joint line, posterior   Worsened by: nothing  Better with: icing, resting  "  Treatments tried: rest/activity avoidance, Tylenol, ibuprofen and casting/splinting/bracing  Associated symptoms: swelling, weakness of left knee, locking or catching and feeling of instability, \"grabbing\" popping    Orthopedic/Surgical history: NO  Social History/Occupation: retired; garden, walking, hiking, kayaking    No family history pertinent to patient's problem today.    REVIEW OF SYSTEMS:  Review of Systems  Skin: no bruising, mild swelling  Musculoskeletal: as above  Neurologic: no numbness, paresthesias  Remainder of review of systems is negative including constitutional, CV, pulmonary, GI, except as noted in HPI or medical history.    OBJECTIVE:  /75   Pulse 62   Ht 1.753 m (5' 9\")    General: healthy, alert and in no distress  HEENT: no scleral icterus or conjunctival erythema  Skin: no suspicious lesions or rash. No jaundice.  CV: distal perfusion intact  Resp: normal respiratory effort without conversational dyspnea   Psych: normal mood and affect  Gait: normal steady gait with appropriate coordination and balance  Neuro: Normal light sensory exam of lower extremity    Bilateral Knee exam    Inspection:      mild effusion left    Patella:      Crepitus noted in the patellofemoral joint bilateral    Tender:      medial joint line left    Non Tender:      remainder of knee area bilateral    Knee ROM:      Full active and passive ROM with flexion and extension bilateral    Hip ROM:     Full active and passive ROM bilateral    Strength:      5/5 with knee extension left    Special Tests:     neg (-) Vanessa bilateral       neg (-) anterior drawer bilateral       neg (-) posterior drawer bilateral       neg (-) varus at 0 deg and 30 deg left       neg (-) valgus at 0 deg and 30 deg left    Gait:      normal    Lower Extremity Alignment:      Normal    Neurovascular:      2+ peripheral pulses bilaterally and brisk capillary refill       sensation grossly intact    RADIOLOGY:  I independently " ordered, visualized and reviewed these images with the patient  AP and sunrise bilateral and left lateral XR views of knees reviewed: no acute bony abnormality, mild medial compartment degenerative change  - will follow official read    Review of the result(s) of each unique test - xr       Large Joint Injection/Arthocentesis: L knee joint    Date/Time: 6/29/2022 2:08 PM  Performed by: Anahi Campbell MD  Authorized by: Anahi Campbell MD     Indications:  Pain  Needle Size:  25 G  Guidance: landmark guided    Approach:  Anterolateral  Location:  Knee      Medications:  2 mL lidocaine 1 %; 40 mg triamcinolone 40 MG/ML  Procedure discussed: discussed risks, benefits, and alternatives    Consent Given by:  Patient  Timeout: timeout called immediately prior to procedure    Prep: patient was prepped and draped in usual sterile fashion     The risks, benefits and complications of steroid injection were discussed with the patient (including but not limited to: bleeding, infection, pain, scar, damage to adjacent structures, atrophy or necrosis of soft tissue, skin blanching, failure to relieve symptoms, worsening of symptoms, allergic reaction). After this discussion all questions were addressed and answered and the patient elected to proceed. The patient tolerated the procedure well without complications.  Also discussed that if diabetic, recommend close monitoring of blood sugars over the next week as cortisone injections can temporarily elevate blood sugars.

## 2022-07-04 RX ORDER — TRIAMCINOLONE ACETONIDE 40 MG/ML
40 INJECTION, SUSPENSION INTRA-ARTICULAR; INTRAMUSCULAR
Status: SHIPPED | OUTPATIENT
Start: 2022-06-29

## 2022-07-04 RX ORDER — LIDOCAINE HYDROCHLORIDE 10 MG/ML
2 INJECTION, SOLUTION INFILTRATION; PERINEURAL
Status: SHIPPED | OUTPATIENT
Start: 2022-06-29

## 2022-07-16 ENCOUNTER — HEALTH MAINTENANCE LETTER (OUTPATIENT)
Age: 67
End: 2022-07-16

## 2022-08-22 ENCOUNTER — OFFICE VISIT (OUTPATIENT)
Dept: DERMATOLOGY | Facility: CLINIC | Age: 67
End: 2022-08-22
Payer: COMMERCIAL

## 2022-08-22 DIAGNOSIS — D18.01 ANGIOMA OF SKIN: ICD-10-CM

## 2022-08-22 DIAGNOSIS — L81.4 LENTIGO: ICD-10-CM

## 2022-08-22 DIAGNOSIS — L82.1 SEBORRHEIC KERATOSIS: ICD-10-CM

## 2022-08-22 DIAGNOSIS — D22.9 NEVUS: Primary | ICD-10-CM

## 2022-08-22 PROCEDURE — 99213 OFFICE O/P EST LOW 20 MIN: CPT | Performed by: PHYSICIAN ASSISTANT

## 2022-08-22 ASSESSMENT — PAIN SCALES - GENERAL: PAINLEVEL: NO PAIN (0)

## 2022-08-22 NOTE — PROGRESS NOTES
HPI:   Chief complaints: Hortencia Ramos is a 66 year old female who presents for Full skin cancer screening to rule out skin cancer   Last Skin Exam: 1 year ago      1st Baseline: no  Personal HX of Skin Cancer: no   Personal HX of Malignant Melanoma: no   Family HX of Skin Cancer / Malignant Melanoma: Yes parents with possible NMSC  Personal HX of Atypical Moles:   no  Risk factors: history of frequent sun exposure and burns; goes to [place] for the winter months  New / Changing lesions: yes spot on the right lower leg that is scaly and doesn't want to heal  Social History: Goes to [place] for the winter months. Her daughter and grandson live there. Son that is here is in the [employer] - she has been watching 2 grandchildren this summer.    On review of systems, there are no further skin complaints, patient is feeling otherwise well.  See patient intake sheet.  ROS of the following were done and are negative: Constitutional, Eyes, Ears, Nose,   Mouth, Throat, Cardiovascular, Respiratory, GI, Genitourinary, Musculoskeletal,   Psychiatric, Endocrine, Allergic/Immunologic.    PHYSICAL EXAM:   There were no vitals taken for this visit.  Skin exam performed as follows: Type 2 skin. Mood appropriate  Alert and Oriented X 3. Well developed, well nourished in no distress.  General appearance: Normal  Head including face: Normal  Eyes: conjunctiva and lids: Normal  Mouth: Lips, teeth, gums: Normal  Neck: Normal  Chest-breast/axillae: Normal  Back: Normal  Spleen and liver: Normal  Cardiovascular: Exam of peripheral vascular system by observation for swelling, varicosities, edema: Normal  Genitalia: groin, buttocks: Normal  Extremities: digits/nails (clubbing): Normal  Eccrine and Apocrine glands: Normal  Right upper extremity: Normal  Left upper extremity: Normal  Right lower extremity: Normal  Left lower extremity: Normal  Skin: Scalp and body hair: See below    Pt deferred exam of breasts, groin, buttocks: No    Other  physical findings:  1. Multiple pigmented macules on extremities and trunk  2. Multiple pigmented macules on face, trunk and extremities  3. Multiple vascular papules on trunk, arms and legs  4. Multiple scattered keratotic plaques       Except as noted above, no other signs of skin cancer or melanoma.     ASSESSMENT/PLAN:   Benign Full skin cancer screening today. . Patient with history of none  Advised on monthly self exams and 1 year  Patient Education: Appropriate brochures given.    1. Multiple benign appearing nevi on arms, legs and trunk. Discussed ABCDEs of melanoma and sunscreen.   2. Multiple lentigos on arms, legs and trunk. Advised benign, no treatment needed.  3. Multiple scattered angiomas. Advised benign, no treatment needed.   4. Seborrheic keratosis on arms, legs and trunk. Advised benign, no treatment needed.            Follow-up: yearly FSE/PRN sooner    1.) Patient was asked about new and changing moles. YES  2.) Patient received a complete physical skin examination: YES  3.) Patient was counseled to perform a monthly self skin examination: YES  Scribed By: Kathryn Schmidt MS, PA-C

## 2022-08-22 NOTE — LETTER
8/22/2022         RE: Hortencia Ramos  [address] [facility] [address]        Dear Colleague,    Thank you for referring your patient, Hortencia Ramos, to the [facility]. Please see a copy of my visit note below.    HPI:   Chief complaints: Hortencia Ramos is a 66 year old female who presents for Full skin cancer screening to rule out skin cancer   Last Skin Exam: 1 year ago      1st Baseline: no  Personal HX of Skin Cancer: no   Personal HX of Malignant Melanoma: no   Family HX of Skin Cancer / Malignant Melanoma: Yes parents with possible NMSC  Personal HX of Atypical Moles:   no  Risk factors: history of frequent sun exposure and burns; goes to [place] for the winter months  New / Changing lesions: yes spot on the right lower leg that is scaly and doesn't want to heal  Social History: Goes to [place] for the winter months. Her daughter and grandson live there. Son that is here is in the police academy - she has been watching 2 grandchildren this summer.    On review of systems, there are no further skin complaints, patient is feeling otherwise well.  See patient intake sheet.  ROS of the following were done and are negative: Constitutional, Eyes, Ears, Nose,   Mouth, Throat, Cardiovascular, Respiratory, GI, Genitourinary, Musculoskeletal,   Psychiatric, Endocrine, Allergic/Immunologic.    PHYSICAL EXAM:   There were no vitals taken for this visit.  Skin exam performed as follows: Type 2 skin. Mood appropriate  Alert and Oriented X 3. Well developed, well nourished in no distress.  General appearance: Normal  Head including face: Normal  Eyes: conjunctiva and lids: Normal  Mouth: Lips, teeth, gums: Normal  Neck: Normal  Chest-breast/axillae: Normal  Back: Normal  Spleen and liver: Normal  Cardiovascular: Exam of peripheral vascular system by observation for swelling, varicosities, edema: Normal  Genitalia: groin, buttocks: Normal  Extremities: digits/nails (clubbing): Normal  Eccrine  and Apocrine glands: Normal  Right upper extremity: Normal  Left upper extremity: Normal  Right lower extremity: Normal  Left lower extremity: Normal  Skin: Scalp and body hair: See below    Pt deferred exam of breasts, groin, buttocks: No    Other physical findings:  1. Multiple pigmented macules on extremities and trunk  2. Multiple pigmented macules on face, trunk and extremities  3. Multiple vascular papules on trunk, arms and legs  4. Multiple scattered keratotic plaques       Except as noted above, no other signs of skin cancer or melanoma.     ASSESSMENT/PLAN:   Benign Full skin cancer screening today. . Patient with history of none  Advised on monthly self exams and 1 year  Patient Education: Appropriate brochures given.    1. Multiple benign appearing nevi on arms, legs and trunk. Discussed ABCDEs of melanoma and sunscreen.   2. Multiple lentigos on arms, legs and trunk. Advised benign, no treatment needed.  3. Multiple scattered angiomas. Advised benign, no treatment needed.   4. Seborrheic keratosis on arms, legs and trunk. Advised benign, no treatment needed.            Follow-up: yearly FSE/PRN sooner    1.) Patient was asked about new and changing moles. YES  2.) Patient received a complete physical skin examination: YES  3.) Patient was counseled to perform a monthly self skin examination: YES  Scribed By: Kathryn Schmidt, MS, PABaldevC          Again, thank you for allowing me to participate in the care of your patient.        Sincerely,        Kathryn Schmidt PA-C

## 2022-08-28 ENCOUNTER — MYC MEDICAL ADVICE (OUTPATIENT)
Dept: ORTHOPEDICS | Facility: CLINIC | Age: 67
End: 2022-08-28

## 2022-09-17 ENCOUNTER — HEALTH MAINTENANCE LETTER (OUTPATIENT)
Age: 67
End: 2022-09-17

## 2023-07-29 ENCOUNTER — HEALTH MAINTENANCE LETTER (OUTPATIENT)
Age: 68
End: 2023-07-29

## 2023-10-09 ENCOUNTER — OFFICE VISIT (OUTPATIENT)
Dept: DERMATOLOGY | Facility: CLINIC | Age: 68
End: 2023-10-09
Payer: COMMERCIAL

## 2023-10-09 DIAGNOSIS — D22.9 NEVUS: Primary | ICD-10-CM

## 2023-10-09 DIAGNOSIS — L81.4 LENTIGO: ICD-10-CM

## 2023-10-09 DIAGNOSIS — L82.1 SEBORRHEIC KERATOSIS: ICD-10-CM

## 2023-10-09 DIAGNOSIS — D18.01 ANGIOMA OF SKIN: ICD-10-CM

## 2023-10-09 PROCEDURE — 99213 OFFICE O/P EST LOW 20 MIN: CPT | Performed by: PHYSICIAN ASSISTANT

## 2023-10-09 RX ORDER — PROPRANOLOL HYDROCHLORIDE 80 MG/1
CAPSULE, EXTENDED RELEASE ORAL
COMMUNITY
Start: 2023-07-21

## 2023-10-09 ASSESSMENT — PAIN SCALES - GENERAL: PAINLEVEL: NO PAIN (0)

## 2023-10-09 NOTE — PROGRESS NOTES
HPI:   Chief complaints: Hortencia Ramos is a 66 year old female who presents for Full skin cancer screening to rule out skin cancer   Last Skin Exam: 1 year ago      1st Baseline: no  Personal HX of Skin Cancer: no   Personal HX of Malignant Melanoma: no   Family HX of Skin Cancer / Malignant Melanoma: Yes parents with possible NMSC  Personal HX of Atypical Moles:   no  Risk factors: history of frequent sun exposure and burns; goes to [place] for the winter months  New / Changing lesions: None  Social History: Goes to [place] for the winter months. Her daughter and grandson live there. She watched her 2 grandchildren this summer as her son is an officer with [employer]. They are 11 and 6  On review of systems, there are no further skin complaints, patient is feeling otherwise well.  See patient intake sheet.  ROS of the following were done and are negative: Constitutional, Eyes, Ears, Nose,   Mouth, Throat, Cardiovascular, Respiratory, GI, Genitourinary, Musculoskeletal,   Psychiatric, Endocrine, Allergic/Immunologic.    PHYSICAL EXAM:   There were no vitals taken for this visit.  Skin exam performed as follows: Type 2 skin. Mood appropriate  Alert and Oriented X 3. Well developed, well nourished in no distress.  General appearance: Normal  Head including face: Normal  Eyes: conjunctiva and lids: Normal  Mouth: Lips, teeth, gums: Normal  Neck: Normal  Chest-breast/axillae: Normal  Back: Normal  Spleen and liver: Normal  Cardiovascular: Exam of peripheral vascular system by observation for swelling, varicosities, edema: Normal  Genitalia: groin, buttocks: Normal  Extremities: digits/nails (clubbing): Normal  Eccrine and Apocrine glands: Normal  Right upper extremity: Normal  Left upper extremity: Normal  Right lower extremity: Normal  Left lower extremity: Normal  Skin: Scalp and body hair: See below    Pt deferred exam of breasts, groin, buttocks: No    Other physical findings:  1. Multiple pigmented macules on extremities  and trunk  2. Multiple pigmented macules on face, trunk and extremities  3. Multiple vascular papules on trunk, arms and legs  4. Multiple scattered keratotic plaques       Except as noted above, no other signs of skin cancer or melanoma.     ASSESSMENT/PLAN:   Benign Full skin cancer screening today. . Patient with history of none  Advised on monthly self exams and 1 year  Patient Education: Appropriate brochures given.    Multiple benign appearing nevi on arms, legs and trunk. Discussed ABCDEs of melanoma and sunscreen.   Multiple lentigos on arms, legs and trunk. Advised benign, no treatment needed.  Multiple scattered angiomas. Advised benign, no treatment needed.   Seborrheic keratosis on arms, legs and trunk. Advised benign, no treatment needed.  Hyperkeratosis on the heel  --Start OTC 40% urea cream               Follow-up:  FSE every 1-2 years/PRN sooner    1.) Patient was asked about new and changing moles. YES  2.) Patient received a complete physical skin examination: YES  3.) Patient was counseled to perform a monthly self skin examination: YES  Scribed By: Kathryn Schmidt MS, PABaldevC

## 2023-10-09 NOTE — LETTER
10/9/2023         RE: Hortencia Ramos  [address] [facility] [address]        Dear Colleague,    Thank you for referring your patient, Hortencia Ramos, to the [facility]. Please see a copy of my visit note below.    HPI:   Chief complaints: Hortencia Ramos is a 66 year old female who presents for Full skin cancer screening to rule out skin cancer   Last Skin Exam: 1 year ago      1st Baseline: no  Personal HX of Skin Cancer: no   Personal HX of Malignant Melanoma: no   Family HX of Skin Cancer / Malignant Melanoma: Yes parents with possible NMSC  Personal HX of Atypical Moles:   no  Risk factors: history of frequent sun exposure and burns; goes to [place] for the winter months  New / Changing lesions: None  Social History: Goes to [place] for the winter months. Her daughter and grandson live there. She watched her 2 grandchildren this summer as her son is an officer with [employer]. They are 11 and 6  On review of systems, there are no further skin complaints, patient is feeling otherwise well.  See patient intake sheet.  ROS of the following were done and are negative: Constitutional, Eyes, Ears, Nose,   Mouth, Throat, Cardiovascular, Respiratory, GI, Genitourinary, Musculoskeletal,   Psychiatric, Endocrine, Allergic/Immunologic.    PHYSICAL EXAM:   There were no vitals taken for this visit.  Skin exam performed as follows: Type 2 skin. Mood appropriate  Alert and Oriented X 3. Well developed, well nourished in no distress.  General appearance: Normal  Head including face: Normal  Eyes: conjunctiva and lids: Normal  Mouth: Lips, teeth, gums: Normal  Neck: Normal  Chest-breast/axillae: Normal  Back: Normal  Spleen and liver: Normal  Cardiovascular: Exam of peripheral vascular system by observation for swelling, varicosities, edema: Normal  Genitalia: groin, buttocks: Normal  Extremities: digits/nails (clubbing): Normal  Eccrine and Apocrine glands: Normal  Right upper extremity: Normal  Left  upper extremity: Normal  Right lower extremity: Normal  Left lower extremity: Normal  Skin: Scalp and body hair: See below    Pt deferred exam of breasts, groin, buttocks: No    Other physical findings:  1. Multiple pigmented macules on extremities and trunk  2. Multiple pigmented macules on face, trunk and extremities  3. Multiple vascular papules on trunk, arms and legs  4. Multiple scattered keratotic plaques       Except as noted above, no other signs of skin cancer or melanoma.     ASSESSMENT/PLAN:   Benign Full skin cancer screening today. . Patient with history of none  Advised on monthly self exams and 1 year  Patient Education: Appropriate brochures given.    Multiple benign appearing nevi on arms, legs and trunk. Discussed ABCDEs of melanoma and sunscreen.   Multiple lentigos on arms, legs and trunk. Advised benign, no treatment needed.  Multiple scattered angiomas. Advised benign, no treatment needed.   Seborrheic keratosis on arms, legs and trunk. Advised benign, no treatment needed.  Hyperkeratosis on the heel  --Start OTC 40% urea cream               Follow-up:  FSE every 1-2 years/PRN sooner    1.) Patient was asked about new and changing moles. YES  2.) Patient received a complete physical skin examination: YES  3.) Patient was counseled to perform a monthly self skin examination: YES  Scribed By: Kathryn Schmidt, MS, PABaldevC      Again, thank you for allowing me to participate in the care of your patient.        Sincerely,        Kathryn Schmidt PA-C

## 2024-04-16 ENCOUNTER — LAB (OUTPATIENT)
Dept: LAB | Facility: CLINIC | Age: 69
End: 2024-04-16
Payer: COMMERCIAL

## 2024-04-16 PROCEDURE — 87070 CULTURE OTHR SPECIMN AEROBIC: CPT

## 2024-04-18 LAB — BACTERIA SPEC CULT: NORMAL

## 2024-05-21 ENCOUNTER — MYC MEDICAL ADVICE (OUTPATIENT)
Dept: DERMATOLOGY | Facility: CLINIC | Age: 69
End: 2024-05-21
Payer: COMMERCIAL

## 2024-05-21 DIAGNOSIS — L40.9 PSORIASIS: Primary | ICD-10-CM

## 2024-05-21 RX ORDER — CLOBETASOL PROPIONATE 0.5 MG/G
CREAM TOPICAL
Qty: 60 G | Refills: 3 | Status: SHIPPED | OUTPATIENT
Start: 2024-05-21

## 2024-05-21 NOTE — TELEPHONE ENCOUNTER
See My chart message/photos.    Please advise. Scheduled to be seen 11-4-24.     Kerry Sparks RN

## 2024-05-21 NOTE — TELEPHONE ENCOUNTER
Can use clobetasol BID x 1-2 weeks then PRN. Order pended. Ok to send to pharmacy of choice.       Please have her keep her follow-up appt as this is a new problem I have not seen before

## 2024-07-13 ENCOUNTER — HEALTH MAINTENANCE LETTER (OUTPATIENT)
Age: 69
End: 2024-07-13

## 2024-11-04 ENCOUNTER — OFFICE VISIT (OUTPATIENT)
Dept: DERMATOLOGY | Facility: CLINIC | Age: 69
End: 2024-11-04
Payer: COMMERCIAL

## 2024-11-04 DIAGNOSIS — L40.3 PALMOPLANTAR PUSTULAR PSORIASIS: Primary | ICD-10-CM

## 2024-11-04 PROCEDURE — 99213 OFFICE O/P EST LOW 20 MIN: CPT | Performed by: PHYSICIAN ASSISTANT

## 2024-11-04 RX ORDER — CALCIPOTRIENE 0.05 MG/ML
SOLUTION TOPICAL
Qty: 60 ML | Refills: 11 | Status: SHIPPED | OUTPATIENT
Start: 2024-11-04

## 2024-11-04 NOTE — PROGRESS NOTES
HPI:   Chief complaints: Hortencia Ramos is a pleasant 68 year old female who presents for evaluation of psoriasis on the feet. She has had this for over 1 year. Initially she used urea cream but this did not help. She then tried clobetasol cream and has been using this for several months but this is not helping either. She has had this on her hands in the past but is not present now.       PHYSICAL EXAM:    There were no vitals taken for this visit.  Skin exam performed as follows: Type 2 skin. Mood appropriate  Alert and Oriented X 3. Well developed, well nourished in no distress.  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 Discussed acitretin vs methotrexate; she is leaving for [place] for the next 5 months this week. Will hold off on this for now as there is no way to do appropriate lab monitoring.   --Start calcipotriene mixed with clobetasol   --Get 10-15 min of sunlight on the bottoms of feet 4-5 times per week            Follow-up: Spring when back from [place]  CC:   Scribed By: Kathryn Schmidt, MS, PABaldevC

## 2024-11-04 NOTE — PATIENT INSTRUCTIONS
For the feet this is palmoplantar pustular psoriasis     When in [place] get 10-15 min of sunlight on your feet 3-5 days per week     Apply calcipotriene solution twice per day whenever     Limit the clobetasol to twice per day for 2 weeks then take a break for 1-2 weeks then resume     Follow-up in the spring - we will consider starting acitretin or methotrexate

## 2024-11-04 NOTE — LETTER
11/4/2024      Hortencia Ramos  [address] [facility] [address]      Dear Colleague,    Thank you for referring your patient, Hortencia Ramos, to the [facility]. Please see a copy of my visit note below.    HPI:   Chief complaints: Hortencia Ramos is a pleasant 68 year old female who presents for evaluation of psoriasis on the feet. She has had this for over 1 year. Initially she used urea cream but this did not help. She then tried clobetasol cream and has been using this for several months but this is not helping either. She has had this on her hands in the past but is not present now.       PHYSICAL EXAM:    There were no vitals taken for this visit.  Skin exam performed as follows: Type 2 skin. Mood appropriate  Alert and Oriented X 3. Well developed, well nourished in no distress.  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 Discussed acitretin vs methotrexate; she is leaving for [place] for the next 5 months this week. Will hold off on this for now as there is no way to do appropriate lab monitoring.   --Start calcipotriene mixed with clobetasol   --Get 10-15 min of sunlight on the bottoms of feet 4-5 times per week            Follow-up: Spring when back from [place]  CC:   Scribed By: Kathryn Schmidt, MS, PAISATU      Again, thank you for allowing me to participate in the care of your patient.        Sincerely,        Kathryn Schmidt PA-C

## 2024-11-05 ENCOUNTER — TELEPHONE (OUTPATIENT)
Dept: DERMATOLOGY | Facility: CLINIC | Age: 69
End: 2024-11-05
Payer: COMMERCIAL

## 2024-11-05 DIAGNOSIS — L40.9 PSORIASIS: Primary | ICD-10-CM

## 2024-11-05 RX ORDER — CALCIPOTRIENE 50 UG/G
CREAM TOPICAL
Qty: 60 G | Refills: 5 | Status: SHIPPED | OUTPATIENT
Start: 2024-11-05

## 2024-11-05 NOTE — TELEPHONE ENCOUNTER
[facility] Call Center    Phone Message    May a detailed message be left on voicemail: no     Reason for Call: Other: Aaron calling from [place].  [phone]. Medication: calcipotriene (DOVONOX) 0.005 % external solution.  Medication is very expensive.  Wondering if they have an alternative or can they go with with the Calcipotriene Cream.  Cheaper option.      Action Taken: Other: [place] DERM    Travel Screening: Not Applicable     Date of Service:

## 2024-11-05 NOTE — TELEPHONE ENCOUNTER
Unable to reach pt. Cannot reach on listed home number. Message left on listed cell (voice mail says this is Hortencia) that rx sent to pharmacy as requested. Call if questions.    Kerry Sparks RN

## 2024-11-05 NOTE — TELEPHONE ENCOUNTER
I spoke to the pharmacist and he said the cream is an $81 co pay and the pt said she is okay with that.     Patient has not yet met her deductible this year which is why the rx was so expensive...    Kerry Sparks RN

## 2024-11-05 NOTE — TELEPHONE ENCOUNTER
Is the cream or ointment better covered per the pharmacy? Epic said the solution was which is why I sent the solution.

## 2025-02-26 ENCOUNTER — APPOINTMENT (OUTPATIENT)
Dept: URBAN - METROPOLITAN AREA CLINIC 170 | Facility: CLINIC | Age: 70
Setting detail: DERMATOLOGY
End: 2025-02-26

## 2025-02-26 DIAGNOSIS — L57.8 OTHER SKIN CHANGES DUE TO CHRONIC EXPOSURE TO NONIONIZING RADIATION: ICD-10-CM

## 2025-02-26 DIAGNOSIS — L82.1 OTHER SEBORRHEIC KERATOSIS: ICD-10-CM

## 2025-02-26 PROCEDURE — 99203 OFFICE O/P NEW LOW 30 MIN: CPT

## 2025-02-26 PROCEDURE — ? COUNSELING

## 2025-02-26 ASSESSMENT — LOCATION SIMPLE DESCRIPTION DERM
LOCATION SIMPLE: LEFT TEMPLE
LOCATION SIMPLE: RIGHT FOREHEAD

## 2025-02-26 ASSESSMENT — LOCATION ZONE DERM: LOCATION ZONE: FACE

## 2025-02-26 ASSESSMENT — LOCATION DETAILED DESCRIPTION DERM
LOCATION DETAILED: RIGHT MEDIAL FOREHEAD
LOCATION DETAILED: LEFT CENTRAL TEMPLE

## 2025-02-26 NOTE — HPI: SKIN LESION
What Type Of Note Output Would You Prefer (Optional)?: Standard Output
Has Your Skin Lesion Been Treated?: not been treated
Is This A New Presentation, Or A Follow-Up?: Skin Lesion
Additional History: Pt reports bleeding if scratching too much.

## 2025-05-08 ENCOUNTER — MEDICAL CORRESPONDENCE (OUTPATIENT)
Dept: HEALTH INFORMATION MANAGEMENT | Facility: CLINIC | Age: 70
End: 2025-05-08

## 2025-05-08 ENCOUNTER — OFFICE VISIT (OUTPATIENT)
Dept: DERMATOLOGY | Facility: CLINIC | Age: 70
End: 2025-05-08
Payer: COMMERCIAL

## 2025-05-08 DIAGNOSIS — L40.9 PSORIASIS: Primary | ICD-10-CM

## 2025-05-08 NOTE — PROGRESS NOTES
[facility] Dermatology Note  Encounter Date: May 8, 2025  Office Visit     Reviewed patients past medical history and pertinent chart review prior to patients visit today.     Dermatology Problem List:  1.  Psoriasis, feet   - Clobetasol 0.05% cream and calcipotriene 0.005% cream   - Previous: Triamcinolone 0.1% cream   - Prescribed narrowband UVB phototherapy at home unit 5/8/2025    ____________________________________________    CC: Psoriasis (Follow up - currently using clobetasol with no relief )    HPI:  Ms. Hortencia Ramos is a(n) 69 year old female who presents today as a return patient for psoriasis.  The psoriasis mainly affects the patient's feet, but she does note 1 papule involving the right calf.  The patient states she has a history of psoriasis since a very young age.  In her 30s it affected the palms, but now is mostly on the feet for the past 2 years.  She also recalls a history of psoriasis on the legs and trunk in the past.  The patient is currently using clobetasol 0.05% cream nightly with no improvement.  She was also prescribed calcipotriene 0.005% cream.  However, this was very expensive and not effective.  Patient is inquiring about other treatment options.    Patient is otherwise feeling well, without additional skin concerns.    Medications:  Current Outpatient Medications   Medication Sig Dispense Refill    ascorbic acid (VITAMIN C) 1000 MG TABS Take 1,000 mg by mouth      calcipotriene (DOVONOX) 0.005 % external cream Apply to AA BID PRN 60 g 5    calcipotriene (DOVONOX) 0.005 % external solution Apply to feet BID PRN 60 mL 11    calcium carbonate 500 mg-vitamin D 200 units (OSCAL WITH D;OYSTER SHELL CALCIUM) 500-200 MG-UNIT per tablet Take 1 tablet by mouth      citalopram (CELEXA) 10 MG tablet Take 10 mg by mouth      clobetasol propionate (TEMOVATE) 0.05 % external cream Apply to AA BID x 1-2 weeks then PRN. Do not apply to face. 60 g 3    GLUCOSAMINE HCL PO Take  500 mg by mouth      Inulin (FIBER CHOICE PO)       Probiotic Product ([employer] PROBIOTIC MAXIMUM STRENGTH) CAPS Patient reports taking 2 times daily      propranolol ER (INDERAL LA) 80 MG 24 hr capsule Take 1 Capsule (80 mg) by mouth once daily.      propranolol SR BEADS (INDREAL XL) 80 MG 24 hr capsule Take 80 mg by mouth      rizatriptan (MAXALT-MLT) 5 MG ODT       SUMAtriptan (IMITREX) 25 MG tablet Take 25 mg by mouth      triamcinolone (KENALOG) 0.1 % external cream Apply0.5 g to foot BID x 1-2 week then PRN only 60 g 2     No current facility-administered medications for this visit.      Past Medical History:   There is no problem list on file for this patient.    No past medical history on file.    ____________________________________________     Physical Exam:  Vitals: There were no vitals taken for this visit.   SKIN: The exam included feet and right calf.  - Lopez II.  - Right calf, pink, dry, scaly papule  - Bilateral plantar feet, pink, dry, scaling patches with pustules present    - No other lesions of concern on areas examined.                       _________________________________________    Assessment & Plan:   # Psoriasis, feet  The patient has tried and failed topical clobetasol 0.05% cream, triamcinolone 0.1% cream, and calcipotriene 0.005% cream in the past.    Treatment options were discussed including intralesional Kenalog, Otezla, biologic medications, or phototherapy.    The patient does express interest in phototherapy at this time.  Since we do not have a foot/hand unit in office, I will prescribe a narrowband UVB phototherapy foot unit through [facility].     If the patient is not able to get the phototherapy unit, she may be interested in Otezla.  However, the patient does have a history of depression and is currently on medication.  We would need to reach out to her PCP first.    Follow-up 3 months, sooner if needed.    All risks, benefits and alternatives were discussed with  patient.  Patient is in agreement and understands the assessment and plan.  All questions were answered.    Marly Ibrahim PA-C  [facility] Dermatology    CC Referred Self, MD  No address on file on close of this encounter.

## 2025-05-08 NOTE — LETTER
5/8/2025      Hortencia Ramos  [address] [facility] [address]      Dear Colleague,    Thank you for referring your patient, Hortencia Ramos, to the [facility]. Please see a copy of my visit note below.    [facility] Dermatology Note  Encounter Date: May 8, 2025  Office Visit     Reviewed patients past medical history and pertinent chart review prior to patients visit today.     Dermatology Problem List:  1.  Psoriasis, feet   - Clobetasol 0.05% cream and calcipotriene 0.005% cream   - Previous: Triamcinolone 0.1% cream   - Prescribed narrowband UVB phototherapy at home unit 5/8/2025    ____________________________________________    CC: Psoriasis (Follow up - currently using clobetasol with no relief )    HPI:  Ms. Hortencia Ramos is a(n) 69 year old female who presents today as a return patient for psoriasis.  The psoriasis mainly affects the patient's feet, but she does note 1 papule involving the right calf.  The patient states she has a history of psoriasis since a very young age.  In her 30s it affected the palms, but now is mostly on the feet for the past 2 years.  She also recalls a history of psoriasis on the legs and trunk in the past.  The patient is currently using clobetasol 0.05% cream nightly with no improvement.  She was also prescribed calcipotriene 0.005% cream.  However, this was very expensive and not effective.  Patient is inquiring about other treatment options.    Patient is otherwise feeling well, without additional skin concerns.    Medications:  Current Outpatient Medications   Medication Sig Dispense Refill     ascorbic acid (VITAMIN C) 1000 MG TABS Take 1,000 mg by mouth       calcipotriene (DOVONOX) 0.005 % external cream Apply to AA BID PRN 60 g 5     calcipotriene (DOVONOX) 0.005 % external solution Apply to feet BID PRN 60 mL 11     calcium carbonate 500 mg-vitamin D 200 units (OSCAL WITH D;OYSTER SHELL CALCIUM) 500-200 MG-UNIT per  tablet Take 1 tablet by mouth       citalopram (CELEXA) 10 MG tablet Take 10 mg by mouth       clobetasol propionate (TEMOVATE) 0.05 % external cream Apply to AA BID x 1-2 weeks then PRN. Do not apply to face. 60 g 3     GLUCOSAMINE HCL PO Take 500 mg by mouth       Inulin (FIBER CHOICE PO)        Probiotic Product ([employer] PROBIOTIC MAXIMUM STRENGTH) CAPS Patient reports taking 2 times daily       propranolol ER (INDERAL LA) 80 MG 24 hr capsule Take 1 Capsule (80 mg) by mouth once daily.       propranolol SR BEADS (INDREAL XL) 80 MG 24 hr capsule Take 80 mg by mouth       rizatriptan (MAXALT-MLT) 5 MG ODT        SUMAtriptan (IMITREX) 25 MG tablet Take 25 mg by mouth       triamcinolone (KENALOG) 0.1 % external cream Apply0.5 g to foot BID x 1-2 week then PRN only 60 g 2     No current facility-administered medications for this visit.      Past Medical History:   There is no problem list on file for this patient.    No past medical history on file.    ____________________________________________     Physical Exam:  Vitals: There were no vitals taken for this visit.   SKIN: The exam included feet and right calf.  - Lopez II.  - Right calf, pink, dry, scaly papule  - Bilateral plantar feet, pink, dry, scaling patches with pustules present    - No other lesions of concern on areas examined.                       _________________________________________    Assessment & Plan:   # Psoriasis, feet  The patient has tried and failed topical clobetasol 0.05% cream, triamcinolone 0.1% cream, and calcipotriene 0.005% cream in the past.    Treatment options were discussed including intralesional Kenalog, Otezla, biologic medications, or phototherapy.    The patient does express interest in phototherapy at this time.  Since we do not have a foot/hand unit in office, I will prescribe a narrowband UVB phototherapy foot unit through [facility].     If the patient is not able to get the phototherapy unit, she may be interested in  Liliana.  However, the patient does have a history of depression and is currently on medication.  We would need to reach out to her PCP first.    Follow-up 3 months, sooner if needed.    All risks, benefits and alternatives were discussed with patient.  Patient is in agreement and understands the assessment and plan.  All questions were answered.    Marly Ibrahim PA-C  [facility] Dermatology    CC Referred Self, MD  No address on file on close of this encounter.    Again, thank you for allowing me to participate in the care of your patient.        Sincerely,        Marly Ibrahim PA-C    Electronically signed

## 2025-07-19 ENCOUNTER — HEALTH MAINTENANCE LETTER (OUTPATIENT)
Age: 70
End: 2025-07-19